# Patient Record
Sex: FEMALE | Race: BLACK OR AFRICAN AMERICAN | NOT HISPANIC OR LATINO | Employment: UNEMPLOYED | URBAN - METROPOLITAN AREA
[De-identification: names, ages, dates, MRNs, and addresses within clinical notes are randomized per-mention and may not be internally consistent; named-entity substitution may affect disease eponyms.]

---

## 2022-04-17 ENCOUNTER — HOSPITAL ENCOUNTER (EMERGENCY)
Facility: HOSPITAL | Age: 22
Discharge: HOME | End: 2022-04-17
Attending: EMERGENCY MEDICINE
Payer: MEDICAID

## 2022-04-17 VITALS
HEART RATE: 91 BPM | OXYGEN SATURATION: 100 % | RESPIRATION RATE: 20 BRPM | WEIGHT: 112 LBS | DIASTOLIC BLOOD PRESSURE: 72 MMHG | SYSTOLIC BLOOD PRESSURE: 117 MMHG | HEIGHT: 62 IN | BODY MASS INDEX: 20.61 KG/M2 | TEMPERATURE: 98 F

## 2022-04-17 DIAGNOSIS — J02.9 VIRAL PHARYNGITIS: Primary | ICD-10-CM

## 2022-04-17 LAB
GROUP A STREP, MOLECULAR: NEGATIVE
INFLUENZA A, MOLECULAR: NEGATIVE
INFLUENZA B, MOLECULAR: NEGATIVE
SARS-COV-2 RDRP RESP QL NAA+PROBE: NEGATIVE
SPECIMEN SOURCE: NORMAL

## 2022-04-17 PROCEDURE — 27100098 HC SPACER: Mod: ER

## 2022-04-17 PROCEDURE — 87651 STREP A DNA AMP PROBE: CPT | Mod: ER | Performed by: EMERGENCY MEDICINE

## 2022-04-17 PROCEDURE — U0002 COVID-19 LAB TEST NON-CDC: HCPCS | Mod: ER | Performed by: EMERGENCY MEDICINE

## 2022-04-17 PROCEDURE — 99284 EMERGENCY DEPT VISIT MOD MDM: CPT | Mod: 25,ER

## 2022-04-17 PROCEDURE — 94640 AIRWAY INHALATION TREATMENT: CPT | Mod: ER

## 2022-04-17 PROCEDURE — 25000003 PHARM REV CODE 250: Mod: ER | Performed by: EMERGENCY MEDICINE

## 2022-04-17 PROCEDURE — 25000242 PHARM REV CODE 250 ALT 637 W/ HCPCS: Mod: ER | Performed by: EMERGENCY MEDICINE

## 2022-04-17 PROCEDURE — 87502 INFLUENZA DNA AMP PROBE: CPT | Mod: ER | Performed by: EMERGENCY MEDICINE

## 2022-04-17 RX ORDER — CETIRIZINE HYDROCHLORIDE 10 MG/1
10 TABLET ORAL DAILY
Qty: 30 TABLET | Refills: 0 | Status: SHIPPED | OUTPATIENT
Start: 2022-04-17 | End: 2022-05-22

## 2022-04-17 RX ORDER — ALBUTEROL SULFATE 90 UG/1
4 AEROSOL, METERED RESPIRATORY (INHALATION) ONCE
Status: COMPLETED | OUTPATIENT
Start: 2022-04-17 | End: 2022-04-17

## 2022-04-17 RX ORDER — ALBUTEROL SULFATE 90 UG/1
1-2 AEROSOL, METERED RESPIRATORY (INHALATION) EVERY 6 HOURS PRN
Qty: 18 G | Refills: 0 | Status: SHIPPED | OUTPATIENT
Start: 2022-04-17 | End: 2022-05-22 | Stop reason: SDUPTHER

## 2022-04-17 RX ORDER — ACETAMINOPHEN 500 MG
500 TABLET ORAL
Status: COMPLETED | OUTPATIENT
Start: 2022-04-17 | End: 2022-04-17

## 2022-04-17 RX ORDER — CHLORHEXIDINE GLUCONATE ORAL RINSE 1.2 MG/ML
15 SOLUTION DENTAL 2 TIMES DAILY
Qty: 473 ML | Refills: 0 | Status: SHIPPED | OUTPATIENT
Start: 2022-04-17 | End: 2022-05-01

## 2022-04-17 RX ADMIN — ACETAMINOPHEN 500 MG: 500 TABLET ORAL at 08:04

## 2022-04-17 RX ADMIN — ALBUTEROL SULFATE 4 PUFF: 90 AEROSOL, METERED RESPIRATORY (INHALATION) at 08:04

## 2022-04-17 NOTE — Clinical Note
"Kelsey"Zuleyma Hernandez was seen and treated in our emergency department on 4/17/2022.     COVID-19 is present in our communities across the state. There is limited testing for COVID at this time, so not all patients can be tested. In this situation, your employee meets the following criteria:    Kelsey Hernandez has met the criteria for COVID-19 testing and has a NEGATIVE result. The employee can return to work once they are asymptomatic for 24 hours without the use of fever reducing medications (Tylenol, Motrin, etc).     If the employee is not fully vaccinated and had a close contact:  · Retest at 5 to 7 days post-exposure  · If possible, it is recommended that they quarantine for 5 days from the time of contact regardless of their test status.  · A mask should be worn post quarantine for 5 days.  If you have any questions or concerns, or if I can be of further assistance, please do not hesitate to contact me.    Sincerely,             JAIME King RN"

## 2022-04-17 NOTE — ED PROVIDER NOTES
Encounter Date: 4/17/2022       History     Chief Complaint   Patient presents with    Sore Throat     Pt C/O sore throat X 3 days     Kelsey Hernandez is a 21 y.o. female who  has a past medical history of Asthma.    The patient presents to the ED due to throat pain and asthma for the past couple of days.  Patient reports she felt short of breath this evening when she woke up.  She reports her symptoms are not relieved with NyQuil or DayQuil.  She was using her inhaler however she ran out.  She reports no fever no vomiting no urinary complaints or stomach pain.        Review of patient's allergies indicates:  No Known Allergies  Past Medical History:   Diagnosis Date    Asthma      History reviewed. No pertinent surgical history.  History reviewed. No pertinent family history.  Social History     Tobacco Use    Smoking status: Current Every Day Smoker     Types: Vaping with nicotine    Smokeless tobacco: Never Used   Substance Use Topics    Alcohol use: Never    Drug use: Not Currently     Review of Systems   Constitutional: Negative for chills and fever.   HENT: Positive for sore throat.    Respiratory: Positive for cough and shortness of breath.    Cardiovascular: Negative for chest pain.   Gastrointestinal: Negative for nausea and vomiting.   Genitourinary: Negative for dysuria, frequency and urgency.   Musculoskeletal: Negative for back pain, neck pain and neck stiffness.   Skin: Negative for rash and wound.   Neurological: Negative for syncope and weakness.   Hematological: Does not bruise/bleed easily.   Psychiatric/Behavioral: Negative for agitation, behavioral problems and confusion.       Physical Exam     Initial Vitals [04/17/22 0758]   BP Pulse Resp Temp SpO2   117/72 95 19 97.7 °F (36.5 °C) 98 %      MAP       --         Physical Exam    Nursing note and vitals reviewed.  Constitutional: She appears well-developed and well-nourished. She is not diaphoretic. No distress.   HENT:   Head: Normocephalic  and atraumatic.   Mouth/Throat: Oropharynx is clear and moist.   Eyes: EOM are normal. Pupils are equal, round, and reactive to light.   Neck: No tracheal deviation present.   Cardiovascular: Normal rate, regular rhythm, normal heart sounds and intact distal pulses.   Pulmonary/Chest: No stridor. No respiratory distress. She has no wheezes.   Breath sounds diminished bilaterally.   Abdominal: Abdomen is soft. Bowel sounds are normal. She exhibits no distension and no mass. There is no abdominal tenderness.   Musculoskeletal:         General: No edema. Normal range of motion.     Neurological: She is alert and oriented to person, place, and time. No cranial nerve deficit or sensory deficit.   Skin: Skin is warm and dry. Capillary refill takes less than 2 seconds. No rash noted.   Psychiatric: She has a normal mood and affect. Her behavior is normal. Thought content normal.         ED Course   Procedures  Labs Reviewed   GROUP A STREP, MOLECULAR   INFLUENZA A & B BY MOLECULAR   SARS-COV-2 RNA AMPLIFICATION, QUAL    Narrative:     Is the patient symptomatic?->Yes          Imaging Results    None          Medications   albuterol inhaler 4 puff (4 puffs Inhalation Given 4/17/22 0856)   acetaminophen tablet 500 mg (500 mg Oral Given 4/17/22 0850)     Medical Decision Making:   Initial Assessment:   21-year-old female with history of asthma presenting today with shortness of breath and sore throat.  No uvular deviation or abnormal oropharyngeal findings.  Lungs slightly diminished.  Will plan to obtain strep/viral swabs administer albuterol inhaler and will reassess  Differential Diagnosis:   Differential Diagnosis includes, but is not limited to:  Amadeo's angina, epiglottitis, foreign body aspiration, retropharyngeal abscess, peritonsillar abscess, esophageal perforation, mediastinitis, esophagitis, sialolithiasis, parotitis, laryngitis, tracheitis, dental/periapical abscess, TMJ disorder, pneumonia,  Streptococcal/bacterial pharyngitis, viral pharyngitis.    ED Management:  After taking into careful account the historical factors and physical exam findings of the patient's presentation today, in conjunction with the empirical and objective data obtained on ED workup, no acute emergent medical condition has been identified. The patient appears to be low risk for an emergent medical condition and I feel it is safe and appropriate at this time for the patient to be discharged to follow-up as detailed in their discharge instructions for reevaluation and possible continued outpatient workup and management. I have discussed the specifics of the workup with the patient and the patient has verbalized understanding of the details of the workup, the diagnosis, the treatment plan, and the need for outpatient follow-up.  Although the patient has no emergent etiology today this does not preclude the development of an emergent condition so in addition, I have advised the patient that they can return to the ED and/or activate EMS at any time with worsening of their symptoms, change of their symptoms, or with any other medical complaint.  The patient remained comfortable and stable during their visit in the ED.  Discharge and follow-up instructions discussed with the patient who expressed understanding and willingness to comply with my recommendations.               ED Course as of 04/17/22 0933   Sun Apr 17, 2022   0922 Patient reports feeling better at this time.  No wheezing on repeat auscultation.  Decreased air movement noted.  She is no longer short of breath.  Strep COVID and flu swabs are negative.  Will refill her albuterol inhaler.  Does not appear to need steroids at this time for asthma.  Will place referral for to follow-up with family medicine.  Return precautions discussed for increased shortness of breath throat pain fevers any other concerns [RN]      ED Course User Index  [RN] Quinten Caceres Jr., MD              Clinical Impression:   Final diagnoses:  [J02.9] Viral pharyngitis (Primary)          ED Disposition Condition    Discharge Stable        ED Prescriptions     Medication Sig Dispense Start Date End Date Auth. Provider    chlorhexidine (PERIDEX) 0.12 % solution Use as directed 15 mLs in the mouth or throat 2 (two) times daily. Swish and spit twice daily for sore throat as needed for 14 days 473 mL 4/17/2022 5/1/2022 Quinten Caceres Jr., MD    albuterol (PROVENTIL/VENTOLIN HFA) 90 mcg/actuation inhaler Inhale 1-2 puffs into the lungs every 6 (six) hours as needed for Wheezing. Rescue 18 g 4/17/2022 4/17/2023 uQinten Caceres Jr., MD    cetirizine (ZYRTEC) 10 MG tablet Take 1 tablet (10 mg total) by mouth once daily. 30 tablet 4/17/2022 5/17/2022 Quinten Caceres Jr., MD        Follow-up Information     Follow up With Specialties Details Why Contact Info Additional Information    [facility]   [address], [address]  [place] [address] [phone] Please park in [address] or D and use [address] frankel. [facility] Medical Office Bldg. elevators.        Portions of this note were dictated using voice recognition software and may contain dictation related errors in spelling/grammar/syntax not found on text review       Quinten Caceres Jr., MD  04/17/22 7297

## 2022-04-22 ENCOUNTER — TELEPHONE (OUTPATIENT)
Dept: ADMINISTRATIVE | Facility: OTHER | Age: 22
End: 2022-04-22
Payer: MEDICAID

## 2022-05-22 ENCOUNTER — HOSPITAL ENCOUNTER (INPATIENT)
Facility: HOSPITAL | Age: 22
LOS: 2 days | Discharge: HOME | DRG: 203 | End: 2022-05-24
Admitting: INTERNAL MEDICINE
Payer: MEDICAID

## 2022-05-22 DIAGNOSIS — J96.01 ACUTE RESPIRATORY FAILURE WITH HYPOXIA: Primary | ICD-10-CM

## 2022-05-22 DIAGNOSIS — D64.9 NORMOCYTIC ANEMIA: ICD-10-CM

## 2022-05-22 DIAGNOSIS — J45.41 MODERATE PERSISTENT ASTHMA WITH EXACERBATION: ICD-10-CM

## 2022-05-22 DIAGNOSIS — J45.902 MODERATE ASTHMA WITH STATUS ASTHMATICUS, UNSPECIFIED WHETHER PERSISTENT: ICD-10-CM

## 2022-05-22 DIAGNOSIS — Z00.00 HEALTHCARE MAINTENANCE: ICD-10-CM

## 2022-05-22 DIAGNOSIS — J30.2 SEASONAL ALLERGIES: ICD-10-CM

## 2022-05-22 LAB
INFLUENZA A, MOLECULAR: NEGATIVE
INFLUENZA B, MOLECULAR: NEGATIVE
SPECIMEN SOURCE: NORMAL

## 2022-05-22 PROCEDURE — 94640 AIRWAY INHALATION TREATMENT: CPT | Mod: XB

## 2022-05-22 PROCEDURE — 94640 AIRWAY INHALATION TREATMENT: CPT

## 2022-05-22 PROCEDURE — 94761 N-INVAS EAR/PLS OXIMETRY MLT: CPT

## 2022-05-22 PROCEDURE — 25000003 PHARM REV CODE 250

## 2022-05-22 PROCEDURE — 63600175 PHARM REV CODE 636 W HCPCS

## 2022-05-22 PROCEDURE — 12000002 HC ACUTE/MED SURGE SEMI-PRIVATE ROOM

## 2022-05-22 PROCEDURE — 87502 INFLUENZA DNA AMP PROBE: CPT

## 2022-05-22 PROCEDURE — 25000242 PHARM REV CODE 250 ALT 637 W/ HCPCS

## 2022-05-22 RX ORDER — MAGNESIUM SULFATE HEPTAHYDRATE 40 MG/ML
2 INJECTION, SOLUTION INTRAVENOUS
Status: COMPLETED | OUTPATIENT
Start: 2022-05-22 | End: 2022-05-23

## 2022-05-22 RX ORDER — IPRATROPIUM BROMIDE AND ALBUTEROL SULFATE 2.5; .5 MG/3ML; MG/3ML
3 SOLUTION RESPIRATORY (INHALATION)
Status: COMPLETED | OUTPATIENT
Start: 2022-05-22 | End: 2022-05-22

## 2022-05-22 RX ORDER — ALBUTEROL SULFATE 2.5 MG/.5ML
10 SOLUTION RESPIRATORY (INHALATION)
Status: COMPLETED | OUTPATIENT
Start: 2022-05-22 | End: 2022-05-22

## 2022-05-22 RX ORDER — KETOROLAC TROMETHAMINE 30 MG/ML
15 INJECTION, SOLUTION INTRAMUSCULAR; INTRAVENOUS
Status: COMPLETED | OUTPATIENT
Start: 2022-05-22 | End: 2022-05-22

## 2022-05-22 RX ADMIN — KETOROLAC TROMETHAMINE 15 MG: 30 INJECTION, SOLUTION INTRAMUSCULAR at 10:05

## 2022-05-22 RX ADMIN — MAGNESIUM SULFATE 2 G: 2 INJECTION INTRAVENOUS at 11:05

## 2022-05-22 RX ADMIN — IPRATROPIUM BROMIDE AND ALBUTEROL SULFATE 3 ML: .5; 3 SOLUTION RESPIRATORY (INHALATION) at 10:05

## 2022-05-22 RX ADMIN — ALBUTEROL SULFATE 10 MG: 2.5 SOLUTION RESPIRATORY (INHALATION) at 11:05

## 2022-05-22 RX ADMIN — SODIUM CHLORIDE 1000 ML: 0.9 INJECTION, SOLUTION INTRAVENOUS at 10:05

## 2022-05-23 ENCOUNTER — CLINICAL SUPPORT (OUTPATIENT)
Dept: SMOKING CESSATION | Facility: CLINIC | Age: 22
End: 2022-05-23
Payer: MEDICAID

## 2022-05-23 DIAGNOSIS — F17.210 CIGARETTE SMOKER: Primary | ICD-10-CM

## 2022-05-23 PROBLEM — D64.9 NORMOCYTIC ANEMIA: Status: ACTIVE | Noted: 2022-05-23

## 2022-05-23 PROBLEM — J96.01 ACUTE RESPIRATORY FAILURE WITH HYPOXIA: Status: ACTIVE | Noted: 2022-05-23

## 2022-05-23 PROBLEM — Z00.00 HEALTHCARE MAINTENANCE: Status: ACTIVE | Noted: 2022-05-23

## 2022-05-23 PROBLEM — J45.41 MODERATE PERSISTENT ASTHMA WITH EXACERBATION: Status: ACTIVE | Noted: 2022-05-23

## 2022-05-23 PROBLEM — J45.909 ASTHMA: Status: ACTIVE | Noted: 2019-01-10

## 2022-05-23 PROBLEM — J30.2 SEASONAL ALLERGIES: Status: ACTIVE | Noted: 2022-05-23

## 2022-05-23 LAB
ALBUMIN SERPL BCP-MCNC: 3.5 G/DL (ref 3.5–5.2)
ALP SERPL-CCNC: 78 U/L (ref 55–135)
ALT SERPL W/O P-5'-P-CCNC: 8 U/L (ref 10–44)
ANION GAP SERPL CALC-SCNC: 10 MMOL/L (ref 8–16)
AST SERPL-CCNC: 14 U/L (ref 10–40)
BASOPHILS # BLD AUTO: 0.01 K/UL (ref 0–0.2)
BASOPHILS NFR BLD: 0.1 % (ref 0–1.9)
BILIRUB SERPL-MCNC: 0.2 MG/DL (ref 0.1–1)
BUN SERPL-MCNC: 11 MG/DL (ref 6–20)
CALCIUM SERPL-MCNC: 9 MG/DL (ref 8.7–10.5)
CHLORIDE SERPL-SCNC: 109 MMOL/L (ref 95–110)
CO2 SERPL-SCNC: 21 MMOL/L (ref 23–29)
CREAT SERPL-MCNC: 0.7 MG/DL (ref 0.5–1.4)
DIFFERENTIAL METHOD: ABNORMAL
EOSINOPHIL # BLD AUTO: 0 K/UL (ref 0–0.5)
EOSINOPHIL NFR BLD: 0 % (ref 0–8)
ERYTHROCYTE [DISTWIDTH] IN BLOOD BY AUTOMATED COUNT: 12.3 % (ref 11.5–14.5)
EST. GFR  (AFRICAN AMERICAN): >60 ML/MIN/1.73 M^2
EST. GFR  (NON AFRICAN AMERICAN): >60 ML/MIN/1.73 M^2
GLUCOSE SERPL-MCNC: 170 MG/DL (ref 70–110)
HCT VFR BLD AUTO: 32 % (ref 37–48.5)
HGB BLD-MCNC: 10.5 G/DL (ref 12–16)
IMM GRANULOCYTES # BLD AUTO: 0.03 K/UL (ref 0–0.04)
IMM GRANULOCYTES NFR BLD AUTO: 0.4 % (ref 0–0.5)
LYMPHOCYTES # BLD AUTO: 0.6 K/UL (ref 1–4.8)
LYMPHOCYTES NFR BLD: 8.3 % (ref 18–48)
MCH RBC QN AUTO: 29.1 PG (ref 27–31)
MCHC RBC AUTO-ENTMCNC: 32.8 G/DL (ref 32–36)
MCV RBC AUTO: 89 FL (ref 82–98)
MONOCYTES # BLD AUTO: 0.2 K/UL (ref 0.3–1)
MONOCYTES NFR BLD: 2.8 % (ref 4–15)
NEUTROPHILS # BLD AUTO: 6.6 K/UL (ref 1.8–7.7)
NEUTROPHILS NFR BLD: 88.4 % (ref 38–73)
NRBC BLD-RTO: 0 /100 WBC
PLATELET # BLD AUTO: 225 K/UL (ref 150–450)
PMV BLD AUTO: 10.1 FL (ref 9.2–12.9)
POTASSIUM SERPL-SCNC: 4 MMOL/L (ref 3.5–5.1)
PROT SERPL-MCNC: 6.9 G/DL (ref 6–8.4)
RBC # BLD AUTO: 3.61 M/UL (ref 4–5.4)
SODIUM SERPL-SCNC: 140 MMOL/L (ref 136–145)
T3 SERPL-MCNC: 59 NG/DL (ref 60–180)
T4 FREE SERPL-MCNC: 1 NG/DL (ref 0.71–1.51)
TSH SERPL DL<=0.005 MIU/L-ACNC: 0.27 UIU/ML (ref 0.4–4)
WBC # BLD AUTO: 7.48 K/UL (ref 3.9–12.7)

## 2022-05-23 PROCEDURE — S4991 NICOTINE PATCH NONLEGEND: HCPCS | Performed by: STUDENT IN AN ORGANIZED HEALTH CARE EDUCATION/TRAINING PROGRAM

## 2022-05-23 PROCEDURE — 36415 COLL VENOUS BLD VENIPUNCTURE: CPT | Performed by: STUDENT IN AN ORGANIZED HEALTH CARE EDUCATION/TRAINING PROGRAM

## 2022-05-23 PROCEDURE — 11000001 HC ACUTE MED/SURG PRIVATE ROOM

## 2022-05-23 PROCEDURE — 93005 ELECTROCARDIOGRAM TRACING: CPT

## 2022-05-23 PROCEDURE — 25000003 PHARM REV CODE 250: Performed by: STUDENT IN AN ORGANIZED HEALTH CARE EDUCATION/TRAINING PROGRAM

## 2022-05-23 PROCEDURE — 93010 ELECTROCARDIOGRAM REPORT: CPT | Mod: ,,, | Performed by: INTERNAL MEDICINE

## 2022-05-23 PROCEDURE — 27100107 HC POCKET PEAK FLOW METER

## 2022-05-23 PROCEDURE — 63600175 PHARM REV CODE 636 W HCPCS: Performed by: INTERNAL MEDICINE

## 2022-05-23 PROCEDURE — 99406 PT REFUSED TOBACCO CESSATION: ICD-10-PCS | Mod: S$PBB,,,

## 2022-05-23 PROCEDURE — 84480 ASSAY TRIIODOTHYRONINE (T3): CPT | Performed by: STUDENT IN AN ORGANIZED HEALTH CARE EDUCATION/TRAINING PROGRAM

## 2022-05-23 PROCEDURE — 84439 ASSAY OF FREE THYROXINE: CPT | Performed by: STUDENT IN AN ORGANIZED HEALTH CARE EDUCATION/TRAINING PROGRAM

## 2022-05-23 PROCEDURE — 93010 EKG 12-LEAD: ICD-10-PCS | Mod: ,,, | Performed by: INTERNAL MEDICINE

## 2022-05-23 PROCEDURE — 63600175 PHARM REV CODE 636 W HCPCS: Performed by: STUDENT IN AN ORGANIZED HEALTH CARE EDUCATION/TRAINING PROGRAM

## 2022-05-23 PROCEDURE — 84443 ASSAY THYROID STIM HORMONE: CPT | Performed by: STUDENT IN AN ORGANIZED HEALTH CARE EDUCATION/TRAINING PROGRAM

## 2022-05-23 PROCEDURE — 99406 BEHAV CHNG SMOKING 3-10 MIN: CPT | Mod: S$PBB,,,

## 2022-05-23 PROCEDURE — 99900035 HC TECH TIME PER 15 MIN (STAT)

## 2022-05-23 PROCEDURE — 80053 COMPREHEN METABOLIC PANEL: CPT | Performed by: STUDENT IN AN ORGANIZED HEALTH CARE EDUCATION/TRAINING PROGRAM

## 2022-05-23 PROCEDURE — 94761 N-INVAS EAR/PLS OXIMETRY MLT: CPT

## 2022-05-23 PROCEDURE — 94640 AIRWAY INHALATION TREATMENT: CPT

## 2022-05-23 PROCEDURE — 85025 COMPLETE CBC W/AUTO DIFF WBC: CPT | Performed by: STUDENT IN AN ORGANIZED HEALTH CARE EDUCATION/TRAINING PROGRAM

## 2022-05-23 PROCEDURE — 25000242 PHARM REV CODE 250 ALT 637 W/ HCPCS: Performed by: STUDENT IN AN ORGANIZED HEALTH CARE EDUCATION/TRAINING PROGRAM

## 2022-05-23 RX ORDER — ENOXAPARIN SODIUM 100 MG/ML
40 INJECTION SUBCUTANEOUS EVERY 24 HOURS
Status: DISCONTINUED | OUTPATIENT
Start: 2022-05-23 | End: 2022-05-24 | Stop reason: HOSPADM

## 2022-05-23 RX ORDER — PREDNISONE 20 MG/1
40 TABLET ORAL DAILY
Status: DISCONTINUED | OUTPATIENT
Start: 2022-05-23 | End: 2022-05-23

## 2022-05-23 RX ORDER — CETIRIZINE HYDROCHLORIDE 10 MG/1
10 TABLET ORAL DAILY PRN
Status: ON HOLD | COMMUNITY
Start: 2021-07-13 | End: 2022-05-24 | Stop reason: SDUPTHER

## 2022-05-23 RX ORDER — IPRATROPIUM BROMIDE AND ALBUTEROL SULFATE 2.5; .5 MG/3ML; MG/3ML
3 SOLUTION RESPIRATORY (INHALATION) EVERY 4 HOURS PRN
Status: DISCONTINUED | OUTPATIENT
Start: 2022-05-23 | End: 2022-05-24 | Stop reason: HOSPADM

## 2022-05-23 RX ORDER — SODIUM CHLORIDE 0.9 % (FLUSH) 0.9 %
10 SYRINGE (ML) INJECTION
Status: DISCONTINUED | OUTPATIENT
Start: 2022-05-23 | End: 2022-05-24 | Stop reason: HOSPADM

## 2022-05-23 RX ORDER — CETIRIZINE HYDROCHLORIDE 10 MG/1
10 TABLET ORAL DAILY
Status: DISCONTINUED | OUTPATIENT
Start: 2022-05-23 | End: 2022-05-23

## 2022-05-23 RX ORDER — PREDNISONE 20 MG/1
40 TABLET ORAL DAILY
Status: DISCONTINUED | OUTPATIENT
Start: 2022-05-23 | End: 2022-05-24 | Stop reason: HOSPADM

## 2022-05-23 RX ORDER — ALBUTEROL SULFATE 90 UG/1
2 AEROSOL, METERED RESPIRATORY (INHALATION) EVERY 6 HOURS PRN
Status: ON HOLD | COMMUNITY
Start: 2021-07-13 | End: 2022-05-24 | Stop reason: SDUPTHER

## 2022-05-23 RX ORDER — IPRATROPIUM BROMIDE AND ALBUTEROL SULFATE 2.5; .5 MG/3ML; MG/3ML
3 SOLUTION RESPIRATORY (INHALATION) EVERY 4 HOURS
Status: DISCONTINUED | OUTPATIENT
Start: 2022-05-23 | End: 2022-05-23

## 2022-05-23 RX ORDER — LEVOFLOXACIN 750 MG/1
750 TABLET ORAL DAILY
Status: DISCONTINUED | OUTPATIENT
Start: 2022-05-24 | End: 2022-05-24 | Stop reason: HOSPADM

## 2022-05-23 RX ORDER — CETIRIZINE HYDROCHLORIDE 10 MG/1
10 TABLET ORAL DAILY
Status: DISCONTINUED | OUTPATIENT
Start: 2022-05-23 | End: 2022-05-24 | Stop reason: HOSPADM

## 2022-05-23 RX ORDER — LEVOFLOXACIN 5 MG/ML
500 INJECTION, SOLUTION INTRAVENOUS
Status: DISCONTINUED | OUTPATIENT
Start: 2022-05-23 | End: 2022-05-23

## 2022-05-23 RX ORDER — LEVOFLOXACIN 5 MG/ML
750 INJECTION, SOLUTION INTRAVENOUS
Status: DISCONTINUED | OUTPATIENT
Start: 2022-05-23 | End: 2022-05-23

## 2022-05-23 RX ORDER — IPRATROPIUM BROMIDE AND ALBUTEROL SULFATE 2.5; .5 MG/3ML; MG/3ML
3 SOLUTION RESPIRATORY (INHALATION)
Status: DISCONTINUED | OUTPATIENT
Start: 2022-05-23 | End: 2022-05-24 | Stop reason: HOSPADM

## 2022-05-23 RX ORDER — LEVOFLOXACIN 750 MG/1
750 TABLET ORAL DAILY
Status: DISCONTINUED | OUTPATIENT
Start: 2022-05-23 | End: 2022-05-23

## 2022-05-23 RX ORDER — NICOTINE 7MG/24HR
1 PATCH, TRANSDERMAL 24 HOURS TRANSDERMAL DAILY
Status: DISCONTINUED | OUTPATIENT
Start: 2022-05-23 | End: 2022-05-24 | Stop reason: HOSPADM

## 2022-05-23 RX ORDER — FLUTICASONE PROPIONATE 50 MCG
2 SPRAY, SUSPENSION (ML) NASAL DAILY
Status: DISCONTINUED | OUTPATIENT
Start: 2022-05-23 | End: 2022-05-24 | Stop reason: HOSPADM

## 2022-05-23 RX ADMIN — NICOTINE 1 PATCH: 7 PATCH, EXTENDED RELEASE TRANSDERMAL at 02:05

## 2022-05-23 RX ADMIN — IPRATROPIUM BROMIDE AND ALBUTEROL SULFATE 3 ML: .5; 3 SOLUTION RESPIRATORY (INHALATION) at 03:05

## 2022-05-23 RX ADMIN — PREDNISONE 40 MG: 20 TABLET ORAL at 11:05

## 2022-05-23 RX ADMIN — IPRATROPIUM BROMIDE AND ALBUTEROL SULFATE 3 ML: 2.5; .5 SOLUTION RESPIRATORY (INHALATION) at 08:05

## 2022-05-23 RX ADMIN — IPRATROPIUM BROMIDE AND ALBUTEROL SULFATE 3 ML: 2.5; .5 SOLUTION RESPIRATORY (INHALATION) at 07:05

## 2022-05-23 RX ADMIN — CETIRIZINE HYDROCHLORIDE 10 MG: 10 TABLET, FILM COATED ORAL at 02:05

## 2022-05-23 RX ADMIN — LEVOFLOXACIN 750 MG: 750 INJECTION, SOLUTION INTRAVENOUS at 02:05

## 2022-05-23 RX ADMIN — IPRATROPIUM BROMIDE AND ALBUTEROL SULFATE 3 ML: 2.5; .5 SOLUTION RESPIRATORY (INHALATION) at 01:05

## 2022-05-23 NOTE — ED NOTES
Pt here w/ weakness, SOB and chest tightness. Pt was seen and treated twice at [facility] ED today for same complaints. Pt states she felt better this afternoon and was discharged, but when got home started to feel SOB again. Pt was given multiple rounds of breathing treatments, steroids, magnesium, currently room air sat is 98%.

## 2022-05-23 NOTE — PLAN OF CARE
DAMION met with pt and pt's Girlfriend Mariama [phone] at bedside to complete DCA. Pt is visiting her girlfriend from [place]. Pt stated that she does have [facility] [employer] Medicaid in [place]. Pt came into town on Thursday and plans to stay until this weekend. Mariama will help with transportation at time of d/c. Pt does not use and HME and is independent in her ALD's. White board updated with CM name and contact information.  Discharge brochure provided.  Pt encouraged to call with any questions or concerns.  Cm will continue to follow pt through transitions of care and assist with any discharge needs.    Tonny Guzman, MSW  [phone]       05/23/22 1020   Discharge Assessment   Assessment Type Discharge Planning Assessment   Confirmed/corrected address, phone number and insurance Yes   Confirmed Demographics Correct on Facesheet   Source of Information patient   Communicated MONA with patient/caregiver Yes   Reason For Admission Acute respiratory failure with hypoxia   Lives With alone   Facility Arrived From: Girlfriends home   Do you expect to return to your current living situation? Yes   Do you have help at home or someone to help you manage your care at home? No   Prior to hospitilization cognitive status: Alert/Oriented   Current cognitive status: Alert/Oriented   Walking or Climbing Stairs Difficulty none   Dressing/Bathing Difficulty none   Home Accessibility wheelchair accessible   Home Layout Able to live on 1st floor   Equipment Currently Used at Home none   Readmission within 30 days? No   Patient currently being followed by outpatient case management? No   Do you currently have service(s) that help you manage your care at home? No   Do you take prescription medications? Yes   Do you have prescription coverage? Yes   Coverage [facility] - Medicaid   Do you have any problems affording any of your prescribed medications? No   Is the patient taking medications as prescribed? yes   Who is going to help you get  home at discharge? Girlfriend Mariama [phone]   How do you get to doctors appointments? car, drives self   Are you on dialysis? No   Do you take coumadin? No   Discharge Plan A Home   DME Needed Upon Discharge  none   Discharge Plan discussed with: Patient   Discharge Barriers Identified None

## 2022-05-23 NOTE — PROGRESS NOTES
Pharmacist Renal Dose Adjustment Note    Kelsey Hernandez is a 22 y.o. female being treated with the medication levofloxacin    Patient Data:    Vital Signs (Most Recent):  Temp: 98.4 °F (36.9 °C) (05/22/22 2150)  Pulse: (!) 114 (05/23/22 0003)  Resp: (!) 22 (05/23/22 0003)  BP: (!) 103/58 (05/23/22 0002)  SpO2: 98 % (05/23/22 0003)   Vital Signs (72h Range):  Temp:  [97.7 °F (36.5 °C)-98.4 °F (36.9 °C)]   Pulse:  [102-131]   Resp:  [16-28]   BP: (103-136)/(57-78)   SpO2:  [96 %-100 %]      Recent Labs   Lab 05/22/22  1540   CREATININE 0.59     Serum creatinine: 0.59 mg/dL 05/22/22 1540  Estimated creatinine clearance: 116.5 mL/min    Medication:levofloxacin dose: 500 mg frequency daily will be changed to medication:levofloxacin dose:750 mg frequency:daily    Pharmacist's Name: Shavon Wallace  Pharmacist's Extension: [phone]

## 2022-05-23 NOTE — PROGRESS NOTES
[facility] Medicine Progress Note    Primary Team: [facility] Hospitalist Team A  Attending Physician: Shabana Hebert MD  Resident: Dr. Leon, Dr Chavez  Intern: Dr. Murillo, Dr. Todd    Subjective/Interval History      No acute events overnight. Patient persistently tachycardic, received several breathing treatments overnight, wheezing and shortness of breath subjectively improved.      Objective     Physical Examination:  Temp:  [97.1 °F (36.2 °C)-98.4 °F (36.9 °C)] 97.1 °F (36.2 °C)  Pulse:  [110-131] 113  Resp:  [16-28] 18  SpO2:  [96 %-100 %] 98 %  BP: (101-136)/(57-76) 101/57  General: No acute distress, resting comfortably   HEENT: Atraumatic, normocephalic, moist mucous membranes  Cardiovascular: Tachycardic, regular rhythm, normal S1 and S2, no extra heart sounds or murmurs appreciated   Chest wall: Non-tender to palpation, no gross deformities noted   Back: Non-tender to palpation, no abnormal curvature noted, symmetric rise with respiration   Respiratory: Stable on room air, interval improvement in work of breathing, no accessory muscle use noted, inspiratory wheezes resolved, interval improvement of external wheezes   Abdominal: Non-distended, soft, normoactive bowel sounds, non-tender to palpation, no rebound tenderness, no guarding, no organomegaly noted   Extremities: Atraumatic, non-edematous, moving all four extremities   Pulses: 2+ and symmetric radial artery, dorsalis pedis artery, posterior tibial artery  Skin: Non-diaphoretic, non-jaundice, intact  Neurologic: No gross focal neurologic deficits noted     Laboratory:  Recent Labs   Lab 05/22/22  1540 05/23/22  0318   WBC 5.84 7.48   HGB 12.1 10.5*    225   MCV 88 89    140   K 4.0 4.0    109   CO2 23 21*   BUN 11 11   CREATININE 0.59 0.7    170*   CALCIUM 9.4 9.0   PROT 8.4 6.9   ALBUMIN 4.6 3.5   AST 22 14   ALT 15 8*   ALKPHOS 90 78     All laboratory data reviewed    Microbiology: reviewed   5/22/22 Influenza:  negative   5/22/22 covid19: negative     Radiology: reviewed   5/22/22 chest xray: no acute findings      Current Medications:     Infusions:       Scheduled:   albuterol-ipratropium  3 mL Nebulization Q4H    cetirizine  10 mg Oral Daily    enoxaparin  40 mg Subcutaneous Daily    levoFLOXacin  750 mg Intravenous Q24H    predniSONE  40 mg Oral Daily        PRN:  albuterol-ipratropium, sodium chloride 0.9%    Antibiotics and Day Number of Therapy:  Levaquin 750mg daily (5/23 - present)     Assessment/Plan:     Kelsey Hernandez is a 22 y.o. woman with a PMH of Asthma in seasonal allergies who presents for Shortness of breath for three days. The patient was admitted to [facility] internal medicine for acute respiratory failure in the setting of an asthma exacerbation.  Patient likely has acute sinus infection leading to rhinorrhea prompting an asthma exacerbation.    Asthma exacerbation in the setting of moderate persistent asthma   -history of worsening shortness of breath despite DuoNebs and steroid therapy.  -patient on albuterol monotherapy in the outpatient setting with daily use.  -admitted for observation for scheduled and p.r.n. DuoNebs, will space out duonebs as tolerated   -patient received 125 Solu-Medrol will continue 40 prednisone daily x5 days  -pt received 2 doses of IV mag sulfate  -initiated Levaquin for infectious component  -initiated on cetirizine 10 mg daily  -initially on continuous pulse ox, all sats >95%, pulseox switched to q4h  -patient tachycardic likely secondary to breathing treatments   -telemetry ordered     Healthcare Maintenance  -not up-to-date on vaccinations secondary to personal preference   -TSH 0.2, T4 normal at 1.00; likely component acute presentation  -T3 pending     Diet: regular  VTE PPx: lovenox   Code Status: full    Dispo: pending improvement of respiratory status  Estimated LOS: 24 hours      Sergio Grande MD  [facility] Internal Medicine -I  [facility] Hospitalist Medicine Team  A  05/23/2022 6:36 AM      [facility] Medicine Hospitalist Pager numbers:   [facility] Hospitalist Medicine Team A (Clarence/Anup): [phone]  [facility] Hospitalist Medicine Team B (Urbano/Sonya):  [phone]

## 2022-05-23 NOTE — PROGRESS NOTES
Smoking cessation education note: Pt is a current every day user of vape with nicotine. She states that she is ready to quit. Referred to 1-800-QUIT-NOW. (Pt is a resident of [place] and is returning home later this week).

## 2022-05-23 NOTE — NURSING
Pt arrived to unit via w/c from ED, AAOX4, no distress noted, virtural and MD notified of arrival, VS taken pt tachycardia, asymptomatic, will monitor

## 2022-05-23 NOTE — PLAN OF CARE
Problem: Adult Inpatient Plan of Care  Goal: Plan of Care Review  Outcome: Ongoing, Progressing     VIRTUAL NURSE:  Labs, notes, orders, and careplan reviewed.       05/23/22 0148   Patient Request   Patient Requested no complaints or needs currently   Admission   Initial VN Admission Questions Complete   Communication Issues? None   Shift   Virtual Nurse - Rounding Complete   Pain Management Interventions pain management plan reviewed with patient/caregiver   Virtual Nurse - Patient Verbalized Approval Of Camera Use;VN Rounding   Type of Frequent Check   Type Patient Rounds   Safety/Activity   Patient Rounds bed in low position;placement of personal items at bedside;bed wheels locked;call light in patient/parent reach;visualized patient;clutter free environment maintained   Safety Promotion/Fall Prevention assistive device/personal item within reach;diversional activities provided;Fall Risk reviewed with patient/family;high risk medications identified;medications reviewed;nonskid shoes/socks when out of bed;room near unit station;supervised activity;instructed to call staff for mobility;side rails raised x 2   Safety Precautions emergency equipment at bedside   Positioning   Head of Bed (HOB) Positioning HOB at 60 degrees   Pain/Comfort/Sleep   Preferred Pain Scale number (Numeric Rating Pain Scale)   Comfort/Acceptable Pain Level 0   Pain Rating (0-10): Rest 0   Sleep/Rest/Relaxation no problem identified;awake

## 2022-05-23 NOTE — NURSING
Pt b/p 87/47 hr 117. Pt asymptomatic at this time. Notified Dr. Grande. No new orders at this time.

## 2022-05-23 NOTE — H&P
[facility] Medicine H&P Note     Admitting Team: [facility] Hospitalist Team A  Attending Physician: Shabana Hebert  Resident: Edward  Intern: Chidi    Date of Admit: 5/22/2022    Chief Complaint     SOB x 1 day    Subjective:      History of Present Illness:  Kelsey Hernandez is a 22 y.o. woman with a PMH of Asthma in seasonal allergies who presents for Shortness of breath for three days.     Patient was in their usual state health until approximately 3 days prior to presentation when she began to experience a runny nose and subsequently a productive cough of yellow/green sputum.  The patient also reported progressive worsening of her shortness of breath.  She reports intermittent shortness of breath that was relieved with her as needed albuterol inhaler for the first day of her symptoms but reports this SOB continued to worsen.  She reports this progressed prompting her to present to the [facility] Emergency Department on 05/21/2022 where she received DuoNebs and steroids with moderate improvement in her symptoms.  She was discharged home and reports had a recurrence of her symptoms prompting a repeat presentation to [facility] on 05/22/2022. The patient again clinically improved and was discharged home.  The patient finally presented to [facility] again on 05/22/2022 with persistent in her symptoms. The patient denies fevers/chills and exposure to sick contacts.  Patient also denies any chest pain, abdominal pain, changes in bowel habits, headache, and blurry vision.    Patient reports she was initially diagnosis a child and has had 3 hospitalizations for her asthma.  She denies ever requiring intubation.    Past Medical History:  Past Medical History:   Diagnosis Date    Asthma        Past Surgical History:  No past surgical history on file.    Allergies:  Review of patient's allergies indicates:  No Known Allergies    Home Medications:  Albuterol Inhaler  Cetrizine     Family History:  No  "family history on file.    Social History:  Social History     Tobacco Use    Smoking status: Current Every Day Smoker     Types: Vaping with nicotine    Smokeless tobacco: Never Used   Substance Use Topics    Alcohol use: Never    Drug use: Not Currently       Review of Systems:  Review of Systems   Constitutional: Positive for malaise/fatigue. Negative for chills, fever and weight loss.   HENT: Positive for congestion. Negative for hearing loss.    Eyes: Negative for double vision.   Respiratory: Positive for cough, sputum production, shortness of breath and wheezing.    Cardiovascular: Negative for chest pain, orthopnea and leg swelling.   Gastrointestinal: Negative for abdominal pain, diarrhea, nausea and vomiting.   Genitourinary: Negative for dysuria.   Musculoskeletal: Negative for myalgias.   Neurological: Negative for loss of consciousness and headaches.   Psychiatric/Behavioral: Negative for substance abuse.     All other systems are reviewed and are negative.    Health Maintaince :   Primary Care Physician: None    Immunizations:   TDap Not UTD    Flu Not UTD  Pna Not UTD  COVID Not UTD     Objective:   Last 24 Hour Vital Signs:  BP  Min: 107/57  Max: 136/76  Temp  Av.1 °F (36.7 °C)  Min: 97.7 °F (36.5 °C)  Max: 98.4 °F (36.9 °C)  Pulse  Av.8  Min: 102  Max: 131  Resp  Av.1  Min: 16  Max: 28  SpO2  Av.2 %  Min: 96 %  Max: 100 %  Height  Av' 2" (157.5 cm)  Min: 5' 2" (157.5 cm)  Max: 5' 2" (157.5 cm)  Weight  Av kg (110 lb 5.3 oz)  Min: 48.5 kg (107 lb)  Max: 50.8 kg (112 lb)  Body mass index is 19.57 kg/m².  No intake/output data recorded.    Physical Examination:  Physical Exam  Constitutional:       General: She is not in acute distress.     Appearance: Normal appearance.   HENT:      Head: Normocephalic.      Nose: Nose normal.      Mouth/Throat:      Mouth: Mucous membranes are moist.   Eyes:      General: No scleral icterus.     Extraocular Movements: Extraocular " movements intact.   Cardiovascular:      Rate and Rhythm: Regular rhythm. Tachycardia present.      Pulses: Normal pulses.      Heart sounds: Normal heart sounds. No murmur heard.    No friction rub. No gallop.   Pulmonary:      Comments: Increased work of breathing.  Bilateral inspiratory and expiratory wheezing with poor air movement.  Abdominal:      General: Bowel sounds are normal. There is no distension.      Palpations: Abdomen is soft.      Tenderness: There is no abdominal tenderness.   Musculoskeletal:         General: Normal range of motion.      Cervical back: Normal range of motion and neck supple.      Right lower leg: No edema.      Left lower leg: No edema.   Skin:     General: Skin is warm and dry.   Neurological:      General: No focal deficit present.      Mental Status: She is alert and oriented to person, place, and time.   Psychiatric:         Mood and Affect: Mood normal.         Behavior: Behavior normal.        Laboratory:  Most Recent Data:  CBC:   Lab Results   Component Value Date    WBC 5.84 05/22/2022    HGB 12.1 05/22/2022    HCT 36.4 (L) 05/22/2022     05/22/2022    MCV 88 05/22/2022    RDW 12.0 05/22/2022     BMP:   Lab Results   Component Value Date     05/22/2022    K 4.0 05/22/2022     05/22/2022    CO2 23 05/22/2022    BUN 11 05/22/2022    CREATININE 0.59 05/22/2022     05/22/2022    CALCIUM 9.4 05/22/2022     LFTs:   Lab Results   Component Value Date    PROT 8.4 05/22/2022    ALBUMIN 4.6 05/22/2022    BILITOT 0.3 05/22/2022    AST 22 05/22/2022    ALKPHOS 90 05/22/2022    ALT 15 05/22/2022     Coags: No results found for: INR, PROTIME, PTT  FLP: No results found for: CHOL, HDL, LDLCALC, TRIG, CHOLHDL  DM:   Lab Results   Component Value Date    CREATININE 0.59 05/22/2022     Thyroid: No results found for: TSH, FREET4, L8YUBEF, M4YVGFR, THYROIDAB  Anemia: No results found for: IRON, TIBC, FERRITIN, PDVCBFHI92, FOLATE  Cardiac: No results found for:  TROPONINI, CKTOTAL, CKMB, BNP  Urinalysis:   Lab Results   Component Value Date    COLORU Yellow 05/22/2022    SPECGRAV 1.020 05/22/2022    NITRITE Negative 05/22/2022    KETONESU Negative 05/22/2022    UROBILINOGEN 1.0 05/22/2022       Microbiology Data:  SARS-CoV-2: Negative  Flu: Negative    Other Results:  EKG (my interpretation): Normal axis, sinus tachycardia without ST or T wave abnormalities    Radiology:  Imaging Results    None         Assessment:     Kelsey Hernandez is a 22 y.o. woman with a PMH of Asthma in seasonal allergies who presents for Shortness of breath for three days. The patient was admitted to [facility] internal medicine for acute respiratory failure in the setting of an asthma exacerbation.  Patient likely has acute sinus infection needing to rhinorrhea prompting an asthma exacerbation.     Plan:     Acute respiratory failure 2/2 asthma exacerbation  - through a history of worsening shortness of breath despite DuoNebs and steroid therapy.  - patient on albuterol monotherapy in the outpatient setting with daily use.  - admit for observation for scheduled and p.r.n. DuoNebs  - patient received 125 Solu-Medrol will continue 40 prednisone daily x5 days  - pt received 2 doses of IV mag sulfate  - initiate Levaquin for infectious component  - initiated on cetirizine 10 mg daily  - continuous pulse ox the and p.r.n. oxygen  - telemetry    Healthcare Maintenance  - not up-to-date on vaccinations  - pending TSH    Diet: Regular  DVT: Lovenox  IVF: 1L NS  Dispo: Pending improvement in her respiratory status    Code Status:     Full    Davin Leon MD  [facility] Internal Medicine HO-2    [facility] Medicine Hospitalist Pager numbers:   [facility] Hospitalist Medicine Team A (Clarence/Anup): [phone]  [facility] Hospitalist Medicine Team B (Urbano/Sonya):  [phone]

## 2022-05-23 NOTE — ED PROVIDER NOTES
Encounter Date: 5/22/2022       History     Chief Complaint   Patient presents with    Shortness of Breath     SOB x 3 days w/ chest tightness. Hx of asthma. Discharged from [facility] this AM. Went back to [facility] this PM, states admission recommended. Pt felt better so she left, reports symptoms worsened after leaving. Used rescue inhaler at home w/o relief.     HPI   Patient with history of asthma presents due to shortness of breath, wheezing, and chest tightness over the past 3 days.  Says she has been using inhaler at home without relief.  Also notes nasal congestion, runny nose, and slight cough.  She has been seen twice in the past 24 hours at [facility] ED, recommended hospital admission after second visit but patient was feeling better after treatment and went home.  Symptoms returned at home and have persisted prompting her to return to ED.  Patient says she no longer has nebulizer at home, also reports being on Breo inhaler in the past but does not currently have insurance to cover this medication anymore.      Review of patient's allergies indicates:  No Known Allergies  Past Medical History:   Diagnosis Date    Asthma      No past surgical history on file.  No family history on file.  Social History     Tobacco Use    Smoking status: Current Every Day Smoker     Types: Vaping with nicotine    Smokeless tobacco: Never Used   Substance Use Topics    Alcohol use: Never    Drug use: Not Currently     Review of Systems   Constitutional: Negative for chills and fever.   HENT: Positive for congestion, postnasal drip and rhinorrhea.    Eyes: Negative for visual disturbance.   Respiratory: Positive for cough, shortness of breath and wheezing.    Cardiovascular: Positive for chest pain (tightness).   Gastrointestinal: Negative for abdominal pain, nausea and vomiting.   Genitourinary: Negative for dysuria.   Musculoskeletal: Negative for back pain and myalgias.   Allergic/Immunologic: Negative for  immunocompromised state.   Neurological: Negative for light-headedness and headaches.   Hematological: Does not bruise/bleed easily.   Psychiatric/Behavioral: Negative for confusion.       Physical Exam     Initial Vitals [05/22/22 2150]   BP Pulse Resp Temp SpO2   110/64 (!) 124 20 98.4 °F (36.9 °C) 96 %      MAP       --         Physical Exam    Constitutional: She appears well-developed.   HENT:   Head: Normocephalic and atraumatic.   Mouth/Throat: Oropharynx is clear and moist.   Eyes: EOM are normal. Pupils are equal, round, and reactive to light.   Neck:   Normal range of motion.  Cardiovascular: Regular rhythm and normal heart sounds.   Tachycardic   Pulmonary/Chest: She is in respiratory distress. She has wheezes.   Diffuse bilateral wheezing with decreased air movement   Abdominal: Abdomen is soft. Bowel sounds are normal. There is no abdominal tenderness. There is no rebound and no guarding.   Musculoskeletal:         General: No tenderness or edema. Normal range of motion.      Cervical back: Normal range of motion.     Neurological: She is alert and oriented to person, place, and time. GCS score is 15. GCS eye subscore is 4. GCS verbal subscore is 5. GCS motor subscore is 6.   Skin: Skin is warm and dry.   Psychiatric: She has a normal mood and affect.         ED Course   Procedures  Labs Reviewed   INFLUENZA A & B BY MOLECULAR   CBC W/ AUTO DIFFERENTIAL   COMPREHENSIVE METABOLIC PANEL   TSH          Imaging Results    None          Medications   sodium chloride 0.9% flush 10 mL (has no administration in time range)   enoxaparin injection 40 mg (has no administration in time range)   albuterol-ipratropium 2.5 mg-0.5 mg/3 mL nebulizer solution 3 mL (has no administration in time range)   cetirizine tablet 10 mg (has no administration in time range)   predniSONE tablet 40 mg (has no administration in time range)   levoFLOXacin 750 mg/150 mL IVPB 750 mg (has no administration in time range)    albuterol-ipratropium 2.5 mg-0.5 mg/3 mL nebulizer solution 3 mL (has no administration in time range)   albuterol-ipratropium 2.5 mg-0.5 mg/3 mL nebulizer solution 3 mL (3 mLs Nebulization Given 5/22/22 2241)   sodium chloride 0.9% bolus 1,000 mL (0 mLs Intravenous Stopped 5/22/22 2325)   ketorolac injection 15 mg (15 mg Intravenous Given 5/22/22 2240)   albuterol sulfate nebulizer solution 10 mg (10 mg Nebulization Given 5/22/22 2357)   magnesium sulfate 2g in water 50mL IVPB (premix) (2 g Intravenous New Bag 5/22/22 2328)                 ED Course as of 05/23/22 0153   Sun May 22, 2022   2201 Workup from earlier this afternoon reviewed, labs unremarkable, COVID negative, CXR without acute process.  Pregnancy negative.  Patient had received IV steroids and nebulizer treatments in ED this afternoon as well. [KB]      ED Course User Index  [KB] Osmar Bran MD           MDM:  Patient presented to ED for 3rd time in past 24 hours due to worsening asthma symptoms.  Has tried albuterol inhaler at home, has received steroids as well, without lasting relief of respiration complaints.  Workup from this afternoon reviewed and appeared unremarkable.  Influenza swab done here was negative.  Patient given initial nebulizer treatment without much relief or improvement in wheezing, following up with 1-hour long nebulizer treatment and magnesium infusion.  Given recurrent, moderate to severe symptoms throughout the day not responding to home treatment, will admit patient for further evaluation and management.  Discussed with [facility] service who is agreeable with admission.  Patient updated on results and plan.      Clinical Impression:   Final diagnoses:  [J45.902] Moderate asthma with status asthmaticus, unspecified whether persistent       Admitted to inpatient telemetry unit in stable condition, accepted by Dr Hebert.     Osmar Bran MD  05/23/22 0153

## 2022-05-24 VITALS
WEIGHT: 108 LBS | HEART RATE: 75 BPM | OXYGEN SATURATION: 96 % | TEMPERATURE: 97 F | BODY MASS INDEX: 19.88 KG/M2 | SYSTOLIC BLOOD PRESSURE: 107 MMHG | RESPIRATION RATE: 18 BRPM | HEIGHT: 62 IN | DIASTOLIC BLOOD PRESSURE: 68 MMHG

## 2022-05-24 LAB
ALBUMIN SERPL BCP-MCNC: 3.5 G/DL (ref 3.5–5.2)
ALP SERPL-CCNC: 77 U/L (ref 55–135)
ALT SERPL W/O P-5'-P-CCNC: 12 U/L (ref 10–44)
ANION GAP SERPL CALC-SCNC: 9 MMOL/L (ref 8–16)
AST SERPL-CCNC: 13 U/L (ref 10–40)
BASOPHILS # BLD AUTO: 0.01 K/UL (ref 0–0.2)
BASOPHILS NFR BLD: 0.1 % (ref 0–1.9)
BILIRUB SERPL-MCNC: 0.1 MG/DL (ref 0.1–1)
BUN SERPL-MCNC: 17 MG/DL (ref 6–20)
CALCIUM SERPL-MCNC: 9.2 MG/DL (ref 8.7–10.5)
CHLORIDE SERPL-SCNC: 110 MMOL/L (ref 95–110)
CO2 SERPL-SCNC: 23 MMOL/L (ref 23–29)
CREAT SERPL-MCNC: 0.7 MG/DL (ref 0.5–1.4)
DIFFERENTIAL METHOD: ABNORMAL
EOSINOPHIL # BLD AUTO: 0 K/UL (ref 0–0.5)
EOSINOPHIL NFR BLD: 0.1 % (ref 0–8)
ERYTHROCYTE [DISTWIDTH] IN BLOOD BY AUTOMATED COUNT: 12.7 % (ref 11.5–14.5)
EST. GFR  (AFRICAN AMERICAN): >60 ML/MIN/1.73 M^2
EST. GFR  (NON AFRICAN AMERICAN): >60 ML/MIN/1.73 M^2
GLUCOSE SERPL-MCNC: 95 MG/DL (ref 70–110)
HCT VFR BLD AUTO: 34 % (ref 37–48.5)
HGB BLD-MCNC: 11.5 G/DL (ref 12–16)
IMM GRANULOCYTES # BLD AUTO: 0.03 K/UL (ref 0–0.04)
IMM GRANULOCYTES NFR BLD AUTO: 0.3 % (ref 0–0.5)
LYMPHOCYTES # BLD AUTO: 1.9 K/UL (ref 1–4.8)
LYMPHOCYTES NFR BLD: 18.1 % (ref 18–48)
MCH RBC QN AUTO: 29.5 PG (ref 27–31)
MCHC RBC AUTO-ENTMCNC: 33.8 G/DL (ref 32–36)
MCV RBC AUTO: 87 FL (ref 82–98)
MONOCYTES # BLD AUTO: 0.6 K/UL (ref 0.3–1)
MONOCYTES NFR BLD: 5.4 % (ref 4–15)
NEUTROPHILS # BLD AUTO: 7.9 K/UL (ref 1.8–7.7)
NEUTROPHILS NFR BLD: 76 % (ref 38–73)
NRBC BLD-RTO: 0 /100 WBC
PLATELET # BLD AUTO: 237 K/UL (ref 150–450)
PMV BLD AUTO: 10.1 FL (ref 9.2–12.9)
POTASSIUM SERPL-SCNC: 3.8 MMOL/L (ref 3.5–5.1)
PROT SERPL-MCNC: 6.7 G/DL (ref 6–8.4)
RBC # BLD AUTO: 3.9 M/UL (ref 4–5.4)
SODIUM SERPL-SCNC: 142 MMOL/L (ref 136–145)
WBC # BLD AUTO: 10.35 K/UL (ref 3.9–12.7)

## 2022-05-24 PROCEDURE — 94640 AIRWAY INHALATION TREATMENT: CPT

## 2022-05-24 PROCEDURE — 80053 COMPREHEN METABOLIC PANEL: CPT | Performed by: STUDENT IN AN ORGANIZED HEALTH CARE EDUCATION/TRAINING PROGRAM

## 2022-05-24 PROCEDURE — 99900035 HC TECH TIME PER 15 MIN (STAT)

## 2022-05-24 PROCEDURE — 36415 COLL VENOUS BLD VENIPUNCTURE: CPT | Performed by: STUDENT IN AN ORGANIZED HEALTH CARE EDUCATION/TRAINING PROGRAM

## 2022-05-24 PROCEDURE — S4991 NICOTINE PATCH NONLEGEND: HCPCS | Performed by: STUDENT IN AN ORGANIZED HEALTH CARE EDUCATION/TRAINING PROGRAM

## 2022-05-24 PROCEDURE — 94761 N-INVAS EAR/PLS OXIMETRY MLT: CPT

## 2022-05-24 PROCEDURE — 25000242 PHARM REV CODE 250 ALT 637 W/ HCPCS: Performed by: STUDENT IN AN ORGANIZED HEALTH CARE EDUCATION/TRAINING PROGRAM

## 2022-05-24 PROCEDURE — 25000003 PHARM REV CODE 250: Performed by: STUDENT IN AN ORGANIZED HEALTH CARE EDUCATION/TRAINING PROGRAM

## 2022-05-24 PROCEDURE — 85025 COMPLETE CBC W/AUTO DIFF WBC: CPT | Performed by: STUDENT IN AN ORGANIZED HEALTH CARE EDUCATION/TRAINING PROGRAM

## 2022-05-24 PROCEDURE — 63600175 PHARM REV CODE 636 W HCPCS: Performed by: STUDENT IN AN ORGANIZED HEALTH CARE EDUCATION/TRAINING PROGRAM

## 2022-05-24 RX ORDER — FLUTICASONE PROPIONATE AND SALMETEROL 250; 50 UG/1; UG/1
1 POWDER RESPIRATORY (INHALATION) 2 TIMES DAILY
Qty: 180 EACH | Refills: 3 | Status: SHIPPED | OUTPATIENT
Start: 2022-05-24 | End: 2023-05-24

## 2022-05-24 RX ORDER — FLUTICASONE PROPIONATE 110 UG/1
2 AEROSOL, METERED RESPIRATORY (INHALATION) DAILY
Qty: 12 G | Refills: 2 | Status: SHIPPED | OUTPATIENT
Start: 2022-05-24 | End: 2022-05-24 | Stop reason: SDUPTHER

## 2022-05-24 RX ORDER — PREDNISONE 20 MG/1
40 TABLET ORAL DAILY
Qty: 4 TABLET | Refills: 0 | Status: SHIPPED | OUTPATIENT
Start: 2022-05-24 | End: 2022-05-24 | Stop reason: SDUPTHER

## 2022-05-24 RX ORDER — FLUTICASONE PROPIONATE 110 UG/1
2 AEROSOL, METERED RESPIRATORY (INHALATION) DAILY
Qty: 12 G | Refills: 3 | Status: SHIPPED | OUTPATIENT
Start: 2022-05-24 | End: 2023-05-24

## 2022-05-24 RX ORDER — ALBUTEROL SULFATE 90 UG/1
2 AEROSOL, METERED RESPIRATORY (INHALATION) EVERY 6 HOURS PRN
Qty: 18 G | Refills: 0 | Status: SHIPPED | OUTPATIENT
Start: 2022-05-24 | End: 2023-12-28

## 2022-05-24 RX ORDER — LEVOFLOXACIN 750 MG/1
750 TABLET ORAL DAILY
Qty: 2 TABLET | Refills: 0 | Status: SHIPPED | OUTPATIENT
Start: 2022-05-24 | End: 2022-05-24 | Stop reason: SDUPTHER

## 2022-05-24 RX ORDER — PREDNISONE 20 MG/1
40 TABLET ORAL DAILY
Qty: 4 TABLET | Refills: 0 | Status: SHIPPED | OUTPATIENT
Start: 2022-05-24

## 2022-05-24 RX ORDER — CETIRIZINE HYDROCHLORIDE 10 MG/1
10 TABLET ORAL DAILY PRN
Qty: 30 TABLET | Refills: 0 | Status: SHIPPED | OUTPATIENT
Start: 2022-05-24 | End: 2022-06-23

## 2022-05-24 RX ORDER — ALBUTEROL SULFATE 90 UG/1
2 AEROSOL, METERED RESPIRATORY (INHALATION) EVERY 6 HOURS PRN
Qty: 18 G | Refills: 0 | Status: SHIPPED | OUTPATIENT
Start: 2022-05-24 | End: 2022-05-24 | Stop reason: SDUPTHER

## 2022-05-24 RX ORDER — LEVOFLOXACIN 750 MG/1
750 TABLET ORAL DAILY
Qty: 2 TABLET | Refills: 0 | Status: SHIPPED | OUTPATIENT
Start: 2022-05-24

## 2022-05-24 RX ADMIN — IPRATROPIUM BROMIDE AND ALBUTEROL SULFATE 3 ML: 2.5; .5 SOLUTION RESPIRATORY (INHALATION) at 01:05

## 2022-05-24 RX ADMIN — LEVOFLOXACIN 750 MG: 750 TABLET, FILM COATED ORAL at 10:05

## 2022-05-24 RX ADMIN — NICOTINE 1 PATCH: 7 PATCH, EXTENDED RELEASE TRANSDERMAL at 09:05

## 2022-05-24 RX ADMIN — CETIRIZINE HYDROCHLORIDE 10 MG: 10 TABLET, FILM COATED ORAL at 09:05

## 2022-05-24 RX ADMIN — PREDNISONE 40 MG: 20 TABLET ORAL at 09:05

## 2022-05-24 RX ADMIN — FLUTICASONE PROPIONATE 100 MCG: 50 SPRAY, METERED NASAL at 12:05

## 2022-05-24 RX ADMIN — IPRATROPIUM BROMIDE AND ALBUTEROL SULFATE 3 ML: 2.5; .5 SOLUTION RESPIRATORY (INHALATION) at 07:05

## 2022-05-24 NOTE — PLAN OF CARE
Discharge instructions packet at bedside. Patient voices understanding. IV sites removed, cath tip intact. Telemetry discontinued. Patient shows no acute distress.

## 2022-05-24 NOTE — PLAN OF CARE
Sw met with pt for final assessment. Pt plans on driving herself home later today. Pt's vehicle is in parking lot. SW gave pt [employer] card to help with prescription cost. Pt will follow up with PCP once she is home in [place]. Rounds completed on pt.  All questions addressed.  Bedside nurse to discuss d/c medications.  Discussed importance to attend all f/u appts and take medications as prescribed.  Verbalized understanding.    KOTA Sena  [phone]       05/24/22 8728   Final Note   Assessment Type Final Discharge Note   Anticipated Discharge Disposition Home   What phone number can be called within the next 1-3 days to see how you are doing after discharge? [phone]   Hospital Resources/Appts/Education Provided Appointments scheduled and added to AVS   Post-Acute Status   Coverage [place] MEDICAID   Discharge Delays None known at this time

## 2022-05-24 NOTE — NURSING
Cued into the pt room to review AVS and discharge instructions, no one in the room, noted blue [facility] folder and pt belonging bag no longer at the pt bedside.

## 2022-05-24 NOTE — PROGRESS NOTES
[facility] IM Resident HO-I Progress Note    Admitting Team: [facility] Team A  Attending: BENJAMIN Hebert M.D.  Resident: ZEYAD Leon M.D.  Intern: JAIME Murillo M.D.    Subjective:     Patient seen and examined at bedside this AM. Doing well this morning.  Complains of being tired 2/2 not getting much sleep.  Says her breathing has greatly improved and feels well enough to go home today.  Denies chest pain or shortness of breath, abdominal pain, nausea or vomiting, dizziness, weakness or other complaints or concerns.  Verbalizes understanding of current treatment plan.    Objective:   Last 24 Hour Vital Signs:  BP  Min: 87/47  Max: 109/53  Temp  Av.2 °F (36.8 °C)  Min: 97.7 °F (36.5 °C)  Max: 98.7 °F (37.1 °C)  Pulse  Av.6  Min: 63  Max: 117  Resp  Av.4  Min: 16  Max: 20  SpO2  Av %  Min: 95 %  Max: 100 %  I/O last 3 completed shifts:  In: 1375 [P.O.:375; IV Piggyback:1000]  Out: -     Physical Examination:  Physical Exam  Vitals reviewed.   Constitutional:       General: She is not in acute distress.     Appearance: Normal appearance.   HENT:      Mouth/Throat:      Mouth: Mucous membranes are moist.      Pharynx: Oropharynx is clear.   Eyes:      Extraocular Movements: Extraocular movements intact.      Conjunctiva/sclera: Conjunctivae normal.   Cardiovascular:      Rate and Rhythm: Normal rate and regular rhythm.      Pulses: Normal pulses.      Heart sounds: Normal heart sounds. No murmur heard.  Pulmonary:      Effort: Pulmonary effort is normal. No tachypnea, accessory muscle usage, prolonged expiration or respiratory distress.      Breath sounds: Wheezing (improved since admission) present.   Abdominal:      General: Abdomen is flat. Bowel sounds are normal. There is no distension.      Palpations: Abdomen is soft.      Tenderness: There is no abdominal tenderness.   Musculoskeletal:         General: Normal range of motion.      Cervical back: Normal range of motion and neck supple.      Right lower leg:  No edema.      Left lower leg: No edema.   Skin:     General: Skin is warm and dry.      Capillary Refill: Capillary refill takes less than 2 seconds.   Neurological:      General: No focal deficit present.      Mental Status: She is alert and oriented to person, place, and time.   Psychiatric:         Mood and Affect: Mood normal.         Behavior: Behavior normal.           Laboratory:  Recent Labs   Lab 05/22/22  1540 05/23/22  0318 05/24/22  0320   WBC 5.84 7.48 10.35   HGB 12.1 10.5* 11.5*   HCT 36.4* 32.0* 34.0*    225 237    140 142   K 4.0 4.0 3.8    109 110   CREATININE 0.59 0.7 0.7   BUN 11 11 17   CO2 23 21* 23   ALT 15 8* 12   AST 22 14 13       Other Results:    Radiology:  CXR, PA and Lat (5/22)  FINDINGS:  The lungs are clear, with normal appearance of pulmonary vasculature and no pleural effusion or pneumothorax.     The cardiac silhouette is normal in size. The hilar and mediastinal contours are unremarkable.     Bones are intact.     Impression:     No acute abnormality.    Current Medications:     Infusions:       Scheduled:   albuterol-ipratropium  3 mL Nebulization Q6H WAKE    cetirizine  10 mg Oral Daily    enoxaparin  40 mg Subcutaneous Daily    fluticasone propionate  2 spray Each Nostril Daily    levoFLOXacin  750 mg Oral Daily    nicotine  1 patch Transdermal Daily    predniSONE  40 mg Oral Daily        PRN:  albuterol-ipratropium, sodium chloride 0.9%    Assessment:      Kelsey Hernandez is a 22 y.o. woman with a PMH of Asthma in seasonal allergies who presents for Shortness of breath for three days. The patient was admitted to [facility] internal medicine for acute respiratory failure in the setting of an asthma exacerbation.  Patient likely has acute sinus infection leading to rhinorrhea prompting an asthma exacerbation.    Plan:     Asthma exacerbation in the setting of moderate persistent asthma   -history of worsening shortness of breath despite DuoNebs and steroid  therapy.  -patient on albuterol monotherapy in the outpatient setting with daily use.  -admitted for observation for scheduled and p.r.n. DuoNebs, will space out duonebs as tolerated   -patient received 125 Solu-Medrol will continue 40 prednisone daily x5 days  -pt received 2 doses of IV mag sulfate  -initiated Levaquin for infectious component  -initiated on cetirizine 10 mg daily  -patient tachycardic likely secondary to breathing treatments; now normal rate    - will need inhaled steroid on discharge and close outpatient follow-up     Healthcare Maintenance  -not up-to-date on vaccinations secondary to personal preference   -TSH 0.2, T4 normal at 1.00; likely component acute presentation  -T3 59      Diet: regular  VTE PPx: lovenox   Code Status: full     Dispo:likely discharge today       Vladimir Murillo MD  [facility] HO-I  [facility] Hospitalist Team A    [facility] Medicine Hospitalist Pager numbers:   [facility] Hospitalist Medicine Team A (Clarence/Anup): [phone]  [facility] Hospitalist Medicine Team B (Urbano/Sonya):  [phone]

## 2022-05-24 NOTE — NURSING
Cued into room to complete AVS review with discharge instructions, at this time the pt was walking out of the bathroom in a towel and SN instructed that SN would return to go over discharge instructions in 10-15 minutes. Pt voiced understanding.

## 2022-05-29 NOTE — PHYSICIAN QUERY
PT Name: Kelsey Hernandez  MR #: [id number]     DOCUMENTATION CLARIFICATION     CDS/: Rosanna Alexandre RN CCDS            Contact information:[email or web address]  This form is a permanent document in the medical record.     Query Date: May 29, 2022    By submitting this query, we are merely seeking further clarification of documentation.  Please utilize your independent clinical judgment when addressing the question(s) below.  The Medical Record contains the following   Indicators   Supporting Clinical Findings Location in Medical Record   x Documentation of Respiratory Failure, ARDS Acute respiratory failure 2/2 asthma exacerbation H&P, Hospital medicine PN 5/24   x SOB, PARSONS, Wheezing, Productive Cough, Use of Accessory Muscles, etc. Pulmonary/Chest: She is in respiratory distress. She has wheezes.  Diffuse bilateral wheezing with decreased air movement ER provider note   x RR     ABGs     O2 sat RR=18-21  O2 sat= 95-98% on ROOM AIR Nursing flow sheets    Hypoxia/Hypercapnia      BiPAP/Intubation/Mechanical Ventilation      Supplemental O2      Home O2, Oxygen Dependence      Respiratory distress      x Radiology findings  No acute abnormality CXR 5/22   x Acute/Chronic Illness Moderate persistent asthma with acute exacerbation H&P   x Treatment - patient on albuterol monotherapy in the outpatient setting with daily use.  - admit for observation for scheduled and p.r.n. DuoNebs  - patient received 125 Solu-Medrol will continue 40 prednisone daily x5 days  - pt received 2 doses of IV mag sulfate  - initiate Levaquin for infectious component  - initiated on cetirizine 10 mg daily H&P   x Other Continue nebs, prednisone, levaquin, not on oxygen or hypoxic, add intranasal steroid and saline spray.    Pt on ROOM AIR entire admission Hospital medicine PN 5/23          Nursing flow sheets       The noted clinical guidelines following a diagnosis are only system guidelines and do not replace the providers clinical  judgment.    Provider, please clarify the diagnosis of __Acute respiratory failure___________:    [    ] Acute Respiratory Failure diagnosis is confirmed and additional clinical support/decision-making indicators for the diagnosis include (please specify): _______________________________________________    Please specify type: [   ] Hypoxic  [   ] Hypercapnic   [ X ] Acute respiratory failure is not confirmed and/or it has been ruled out, pt with Moderate persistent asthma exacerbation only   [    ] Other clarification (please specify): ___________________   [   ] Clinically Undetermined         Please document in your progress notes daily for the duration of treatment until resolved and include in your discharge summary.     Reference:    DIAN Chowdhury MD. (2020, March 13). Acute respiratory distress syndrome: Clinical features, diagnosis, and complications in adults ([id number] BETZAIDA Whitley MD & [id number] MIRTA Pedroza MD, Eds.). Retrieved November 13, 2020, from [email or web address]-[email or web address]-features-diagnosis-[email or web address]-[email or web address]?search=ards&source=search_result&selectedTitle=1~150&usage_type=default&display_rank=1  Form No. [id number]

## 2022-08-29 PROBLEM — Z00.00 HEALTHCARE MAINTENANCE: Status: RESOLVED | Noted: 2022-05-23 | Resolved: 2022-08-29

## 2023-05-23 NOTE — DISCHARGE SUMMARY
[facility] Hospital Medicine Discharge Summary    Primary Team: [facility] Hospitalist Team A  Attending Physician: Dr. Shabana Hebert  Resident: Edward  Intern: Chidi    Date of Admit: 5/22/2022  Date of Discharge: 5/24/2022    Discharge to: Home  Condition: Stable    Discharge Diagnoses     Patient Active Problem List   Diagnosis    Asthma    Moderate persistent asthma with exacerbation    Healthcare maintenance    Normocytic anemia    Seasonal allergies       Consultants and Procedures     Consultants:  None    Procedures:   None    Brief History of Present Illness      HPI: Kelsey Hernandez is a 22 y.o. woman with a PMH of Asthma in seasonal allergies who presents for Shortness of breath for three days. Patient was in their usual state health until approximately 3 days prior to presentation when she began to experience a runny nose and subsequently a productive cough of yellow/green sputum.  The patient also reported progressive worsening of her shortness of breath.  She reports intermittent shortness of breath that was relieved with her as needed albuterol inhaler for the first day of her symptoms but reports this SOB continued to worsen.  She reports this progressed prompting her to present to the [facility] Emergency Department on 05/21/2022 where she received DuoNebs and steroids with moderate improvement in her symptoms.  She was discharged home and reports had a recurrence of her symptoms prompting a repeat presentation to [facility] on 05/22/2022. The patient again clinically improved and was discharged home.  The patient finally presented to [facility] again on 05/22/2022 with persistent in her symptoms. The patient denies fevers/chills and exposure to sick contacts. Patient reports she was initially diagnosis a child and has had 3 hospitalizations for her asthma.  She denies ever requiring intubation.    Hospital Course: The patient was initiated on scheduled breathing treatments, oral  [MRI] : MRI [Right] : of the right steroids, and levaquin. The patient's respiratory status clinically improved over the next 48h and the patient was discharged home on oral steroids and levaquin to complete a 5 day course. The patient was also discharged home on a moderate intensity ICS and PRN albuterol with pulmonology follow up.    For the full HPI please refer to the History & Physical from this admission.    Hospital Course By Problem with Pertinent Findings     Asthma exacerbation in the setting of moderate persistent asthma   -history of worsening shortness of breath despite DuoNebs and steroid therapy.  -patient on albuterol monotherapy in the outpatient setting with daily use.  -admitted for observation for scheduled and p.r.n. DuoNebs, will space out duonebs as tolerated   -patient received 125 Solu-Medrol will continue 40 prednisone daily x5 days  -pt received 2 doses of IV mag sulfate  -initiated Levaquin for infectious component  -initiated on cetirizine 10 mg daily  -patient tachycardic likely secondary to breathing treatments; now normal rate    - will need inhaled steroid on discharge and close outpatient follow-up    Normocytic anemia  - Hb 10.5-11.5 during hospitalization  - would assess iron studies in the outpatient setting to further characterize the patient's anemia.     Healthcare Maintenance  -not up-to-date on vaccinations secondary to personal preference   -TSH 0.2, T4 normal at 1.00, T3 59     Discharge Medications        Medication List      START taking these medications    fluticasone propionate 110 mcg/actuation inhaler  Commonly known as: FLOVENT HFA  Inhale 2 puffs into the lungs once daily. Controller     fluticasone-salmeterol 250-50 mcg/dose 250-50 mcg/dose diskus inhaler  Commonly known as: ADVAIR  Inhale 1 puff into the lungs 2 (two) times daily. Controller     levoFLOXacin 750 MG tablet  Commonly known as: LEVAQUIN  Take 1 tablet (750 mg total) by mouth once daily.        CHANGE how you take these medications     albuterol 90 mcg/actuation inhaler  Commonly known as: PROVENTIL/VENTOLIN HFA  Inhale 2 puffs into the lungs every 6 (six) hours as needed for Wheezing or Shortness of Breath.  What changed: reasons to take this        CONTINUE taking these medications    cetirizine 10 MG tablet  Commonly known as: ZYRTEC  Take 1 tablet (10 mg total) by mouth daily as needed.     predniSONE 20 MG tablet  Commonly known as: DELTASONE  Take 2 tablets (40 mg total) by mouth once daily.        STOP taking these medications    budesonide-formoterol 160-4.5 mcg 160-4.5 mcg/actuation Hfaa  Commonly known as: SYMBICORT           Where to Get Your Medications      You can get these medications from any pharmacy    Bring a paper prescription for each of these medications  · albuterol 90 mcg/actuation inhaler  · cetirizine 10 MG tablet  · fluticasone propionate 110 mcg/actuation inhaler  · fluticasone-salmeterol 250-50 mcg/dose 250-50 mcg/dose diskus inhaler  · levoFLOXacin 750 MG tablet  · predniSONE 20 MG tablet         Discharge Information:   Diet:  Regular    Physical Activity:  As tolerated by patient.             Instructions:  1. Take all medications as prescribed  2. Keep all follow-up appointments  3. Return to the hospital or call your primary care physicians if any worsening symptoms such as fever, chest pain, shortness of breath, return of symptoms, or any other concerns.    Follow-Up Appointments:  Pulmonology  PCP    Davin Leon MD  [facility] Internal Medicine HO-[id number]               [Shoulder] : shoulder [Report was reviewed and noted in the chart] : The report was reviewed and noted in the chart [I independently reviewed and interpreted images and report] : I independently reviewed and interpreted images and report [I reviewed the films/CD and additionally noted] : I reviewed the films/CD and additionally noted [FreeTextEntry1] :  advance GH OA, full thickness tear of the anterior supraspinatus tendon, partial thickness tear of the infraspinatus tendon, partial subscapularis tendon tear